# Patient Record
Sex: FEMALE | Employment: PART TIME | URBAN - METROPOLITAN AREA
[De-identification: names, ages, dates, MRNs, and addresses within clinical notes are randomized per-mention and may not be internally consistent; named-entity substitution may affect disease eponyms.]

---

## 2021-02-16 ENCOUNTER — HOSPITAL ENCOUNTER (INPATIENT)
Facility: HOSPITAL | Age: 51
LOS: 4 days | Discharge: HOME | DRG: 673 | End: 2021-02-20
Attending: EMERGENCY MEDICINE | Admitting: HOSPITALIST
Payer: COMMERCIAL

## 2021-02-16 ENCOUNTER — APPOINTMENT (OUTPATIENT)
Dept: CT IMAGING | Age: 51
DRG: 673 | End: 2021-02-16
Attending: EMERGENCY MEDICINE
Payer: COMMERCIAL

## 2021-02-16 DIAGNOSIS — N28.0 RENAL INFARCTION (HCC): Primary | ICD-10-CM

## 2021-02-16 DIAGNOSIS — I10 HYPERTENSION, UNSPECIFIED TYPE: ICD-10-CM

## 2021-02-16 DIAGNOSIS — I82.3 RENAL VEIN THROMBOSIS (HCC): ICD-10-CM

## 2021-02-16 LAB
ALBUMIN SERPL-MCNC: 4.1 G/DL (ref 3.4–5)
ALBUMIN/GLOB SERPL: 1.2 {RATIO} (ref 1–2)
ALP LIVER SERPL-CCNC: 46 U/L
ALT SERPL-CCNC: 73 U/L
ANION GAP SERPL CALC-SCNC: 6 MMOL/L (ref 0–18)
APTT PPP: 27.6 SECONDS (ref 25.4–36.1)
AST SERPL-CCNC: 70 U/L (ref 15–37)
BASOPHILS # BLD AUTO: 0.03 X10(3) UL (ref 0–0.2)
BASOPHILS NFR BLD AUTO: 0.2 %
BILIRUB SERPL-MCNC: 1.3 MG/DL (ref 0.1–2)
BILIRUB UR QL STRIP.AUTO: NEGATIVE
BUN BLD-MCNC: 11 MG/DL (ref 7–18)
BUN/CREAT SERPL: 10.6 (ref 10–20)
CALCIUM BLD-MCNC: 9.2 MG/DL (ref 8.5–10.1)
CHLORIDE SERPL-SCNC: 101 MMOL/L (ref 98–112)
CLARITY UR REFRACT.AUTO: CLEAR
CO2 SERPL-SCNC: 27 MMOL/L (ref 21–32)
COLOR UR AUTO: YELLOW
CREAT BLD-MCNC: 1.04 MG/DL
DEPRECATED RDW RBC AUTO: 43.3 FL (ref 35.1–46.3)
EOSINOPHIL # BLD AUTO: 0.06 X10(3) UL (ref 0–0.7)
EOSINOPHIL NFR BLD AUTO: 0.5 %
ERYTHROCYTE [DISTWIDTH] IN BLOOD BY AUTOMATED COUNT: 13.5 % (ref 11–15)
GLOBULIN PLAS-MCNC: 3.3 G/DL (ref 2.8–4.4)
GLUCOSE BLD-MCNC: 113 MG/DL (ref 70–99)
GLUCOSE UR STRIP.AUTO-MCNC: NEGATIVE MG/DL
HCT VFR BLD AUTO: 40.3 %
HGB BLD-MCNC: 13.6 G/DL
IMM GRANULOCYTES # BLD AUTO: 0.04 X10(3) UL (ref 0–1)
IMM GRANULOCYTES NFR BLD: 0.3 %
INR BLD: 0.95 (ref 0.88–1.11)
KETONES UR STRIP.AUTO-MCNC: 40 MG/DL
LEUKOCYTE ESTERASE UR QL STRIP.AUTO: NEGATIVE
LIPASE SERPL-CCNC: 109 U/L (ref 73–393)
LYMPHOCYTES # BLD AUTO: 1.29 X10(3) UL (ref 1–4)
LYMPHOCYTES NFR BLD AUTO: 9.7 %
M PROTEIN MFR SERPL ELPH: 7.4 G/DL (ref 6.4–8.2)
MCH RBC QN AUTO: 29.4 PG (ref 26–34)
MCHC RBC AUTO-ENTMCNC: 33.7 G/DL (ref 31–37)
MCV RBC AUTO: 87.2 FL
MONOCYTES # BLD AUTO: 0.36 X10(3) UL (ref 0.1–1)
MONOCYTES NFR BLD AUTO: 2.7 %
NEUTROPHILS # BLD AUTO: 11.5 X10 (3) UL (ref 1.5–7.7)
NEUTROPHILS # BLD AUTO: 11.5 X10(3) UL (ref 1.5–7.7)
NEUTROPHILS NFR BLD AUTO: 86.6 %
NITRITE UR QL STRIP.AUTO: NEGATIVE
OSMOLALITY SERPL CALC.SUM OF ELEC: 278 MOSM/KG (ref 275–295)
PH UR STRIP.AUTO: 6 [PH] (ref 4.5–8)
PLATELET # BLD AUTO: 249 10(3)UL (ref 150–450)
POCT LOT NUMBER: NORMAL
POCT URINE PREGNANCY: NEGATIVE
POTASSIUM SERPL-SCNC: 3.7 MMOL/L (ref 3.5–5.1)
PROT UR STRIP.AUTO-MCNC: NEGATIVE MG/DL
PSA SERPL DL<=0.01 NG/ML-MCNC: 12.6 SECONDS (ref 12–14.3)
RBC # BLD AUTO: 4.62 X10(6)UL
SARS-COV-2 RNA RESP QL NAA+PROBE: NOT DETECTED
SODIUM SERPL-SCNC: 134 MMOL/L (ref 136–145)
SP GR UR STRIP.AUTO: >=1.03 (ref 1–1.03)
UROBILINOGEN UR STRIP.AUTO-MCNC: 1 MG/DL
WBC # BLD AUTO: 13.3 X10(3) UL (ref 4–11)

## 2021-02-16 PROCEDURE — 74177 CT ABD & PELVIS W/CONTRAST: CPT | Performed by: EMERGENCY MEDICINE

## 2021-02-16 PROCEDURE — 99223 1ST HOSP IP/OBS HIGH 75: CPT | Performed by: HOSPITALIST

## 2021-02-16 RX ORDER — HEPARIN SODIUM AND DEXTROSE 10000; 5 [USP'U]/100ML; G/100ML
INJECTION INTRAVENOUS CONTINUOUS
Status: DISCONTINUED | OUTPATIENT
Start: 2021-02-17 | End: 2021-02-18

## 2021-02-16 RX ORDER — HEPARIN SODIUM 5000 [USP'U]/ML
60 INJECTION INTRAVENOUS; SUBCUTANEOUS ONCE
Status: COMPLETED | OUTPATIENT
Start: 2021-02-16 | End: 2021-02-16

## 2021-02-16 RX ORDER — HYDROMORPHONE HYDROCHLORIDE 1 MG/ML
0.5 INJECTION, SOLUTION INTRAMUSCULAR; INTRAVENOUS; SUBCUTANEOUS EVERY 30 MIN PRN
Status: DISCONTINUED | OUTPATIENT
Start: 2021-02-16 | End: 2021-02-19

## 2021-02-16 RX ORDER — LABETALOL HYDROCHLORIDE 5 MG/ML
20 INJECTION, SOLUTION INTRAVENOUS ONCE
Status: COMPLETED | OUTPATIENT
Start: 2021-02-16 | End: 2021-02-16

## 2021-02-16 RX ORDER — ACETAMINOPHEN, ASPIRIN AND CAFFEINE 250; 250; 65 MG/1; MG/1; MG/1
1 TABLET, FILM COATED ORAL EVERY 6 HOURS PRN
COMMUNITY

## 2021-02-16 RX ORDER — ONDANSETRON 2 MG/ML
4 INJECTION INTRAMUSCULAR; INTRAVENOUS ONCE
Status: COMPLETED | OUTPATIENT
Start: 2021-02-16 | End: 2021-02-16

## 2021-02-16 RX ORDER — HEPARIN SODIUM AND DEXTROSE 10000; 5 [USP'U]/100ML; G/100ML
12 INJECTION INTRAVENOUS ONCE
Status: COMPLETED | OUTPATIENT
Start: 2021-02-16 | End: 2021-02-17

## 2021-02-16 RX ORDER — KETOROLAC TROMETHAMINE 15 MG/ML
15 INJECTION, SOLUTION INTRAMUSCULAR; INTRAVENOUS ONCE
Status: COMPLETED | OUTPATIENT
Start: 2021-02-16 | End: 2021-02-16

## 2021-02-17 PROBLEM — I16.1 HYPERTENSIVE EMERGENCY: Status: ACTIVE | Noted: 2021-02-17

## 2021-02-17 PROBLEM — F41.9 ANXIETY: Status: ACTIVE | Noted: 2021-02-17

## 2021-02-17 LAB
ANION GAP SERPL CALC-SCNC: 7 MMOL/L (ref 0–18)
APTT PPP: 56.2 SECONDS (ref 25.4–36.1)
APTT PPP: 59.2 SECONDS (ref 25.4–36.1)
APTT PPP: 60.1 SECONDS (ref 25.4–36.1)
APTT PPP: 68.8 SECONDS (ref 25.4–36.1)
BASOPHILS # BLD AUTO: 0.02 X10(3) UL (ref 0–0.2)
BASOPHILS NFR BLD AUTO: 0.2 %
BUN BLD-MCNC: 11 MG/DL (ref 7–18)
BUN/CREAT SERPL: 9.7 (ref 10–20)
CALCIUM BLD-MCNC: 9 MG/DL (ref 8.5–10.1)
CHLORIDE SERPL-SCNC: 104 MMOL/L (ref 98–112)
CO2 SERPL-SCNC: 26 MMOL/L (ref 21–32)
CREAT BLD-MCNC: 1.13 MG/DL
DEPRECATED RDW RBC AUTO: 44.1 FL (ref 35.1–46.3)
EOSINOPHIL # BLD AUTO: 0.05 X10(3) UL (ref 0–0.7)
EOSINOPHIL NFR BLD AUTO: 0.5 %
ERYTHROCYTE [DISTWIDTH] IN BLOOD BY AUTOMATED COUNT: 13.4 % (ref 11–15)
GLUCOSE BLD-MCNC: 109 MG/DL (ref 70–99)
HCT VFR BLD AUTO: 35.7 %
HGB BLD-MCNC: 12 G/DL
IMM GRANULOCYTES # BLD AUTO: 0.02 X10(3) UL (ref 0–1)
IMM GRANULOCYTES NFR BLD: 0.2 %
LYMPHOCYTES # BLD AUTO: 1.38 X10(3) UL (ref 1–4)
LYMPHOCYTES NFR BLD AUTO: 13.8 %
MCH RBC QN AUTO: 30.1 PG (ref 26–34)
MCHC RBC AUTO-ENTMCNC: 33.6 G/DL (ref 31–37)
MCV RBC AUTO: 89.5 FL
MONOCYTES # BLD AUTO: 0.82 X10(3) UL (ref 0.1–1)
MONOCYTES NFR BLD AUTO: 8.2 %
NEUTROPHILS # BLD AUTO: 7.74 X10 (3) UL (ref 1.5–7.7)
NEUTROPHILS # BLD AUTO: 7.74 X10(3) UL (ref 1.5–7.7)
NEUTROPHILS NFR BLD AUTO: 77.1 %
OSMOLALITY SERPL CALC.SUM OF ELEC: 284 MOSM/KG (ref 275–295)
PLATELET # BLD AUTO: 187 10(3)UL (ref 150–450)
PLATELET # BLD AUTO: 187 10(3)UL (ref 150–450)
POTASSIUM SERPL-SCNC: 4.3 MMOL/L (ref 3.5–5.1)
RBC # BLD AUTO: 3.99 X10(6)UL
SODIUM SERPL-SCNC: 137 MMOL/L (ref 136–145)
WBC # BLD AUTO: 10 X10(3) UL (ref 4–11)

## 2021-02-17 RX ORDER — LISINOPRIL 10 MG/1
10 TABLET ORAL DAILY
Status: DISCONTINUED | OUTPATIENT
Start: 2021-02-17 | End: 2021-02-19

## 2021-02-17 RX ORDER — BISACODYL 10 MG
10 SUPPOSITORY, RECTAL RECTAL
Status: DISCONTINUED | OUTPATIENT
Start: 2021-02-17 | End: 2021-02-20

## 2021-02-17 RX ORDER — HYDRALAZINE HYDROCHLORIDE 20 MG/ML
10 INJECTION INTRAMUSCULAR; INTRAVENOUS EVERY 6 HOURS PRN
Status: DISCONTINUED | OUTPATIENT
Start: 2021-02-17 | End: 2021-02-20

## 2021-02-17 RX ORDER — HYDROMORPHONE HYDROCHLORIDE 1 MG/ML
0.8 INJECTION, SOLUTION INTRAMUSCULAR; INTRAVENOUS; SUBCUTANEOUS EVERY 2 HOUR PRN
Status: DISCONTINUED | OUTPATIENT
Start: 2021-02-17 | End: 2021-02-19

## 2021-02-17 RX ORDER — KETOROLAC TROMETHAMINE 15 MG/ML
15 INJECTION, SOLUTION INTRAMUSCULAR; INTRAVENOUS EVERY 6 HOURS PRN
Status: DISCONTINUED | OUTPATIENT
Start: 2021-02-17 | End: 2021-02-18

## 2021-02-17 RX ORDER — KETOROLAC TROMETHAMINE 30 MG/ML
30 INJECTION, SOLUTION INTRAMUSCULAR; INTRAVENOUS EVERY 6 HOURS PRN
Status: DISCONTINUED | OUTPATIENT
Start: 2021-02-17 | End: 2021-02-18

## 2021-02-17 RX ORDER — HYDROMORPHONE HYDROCHLORIDE 1 MG/ML
0.2 INJECTION, SOLUTION INTRAMUSCULAR; INTRAVENOUS; SUBCUTANEOUS EVERY 2 HOUR PRN
Status: DISCONTINUED | OUTPATIENT
Start: 2021-02-17 | End: 2021-02-19

## 2021-02-17 RX ORDER — METOCLOPRAMIDE HYDROCHLORIDE 5 MG/ML
10 INJECTION INTRAMUSCULAR; INTRAVENOUS EVERY 6 HOURS PRN
Status: DISCONTINUED | OUTPATIENT
Start: 2021-02-17 | End: 2021-02-20

## 2021-02-17 RX ORDER — ACETAMINOPHEN 325 MG/1
650 TABLET ORAL EVERY 6 HOURS PRN
Status: DISCONTINUED | OUTPATIENT
Start: 2021-02-17 | End: 2021-02-20

## 2021-02-17 RX ORDER — DOCUSATE SODIUM 100 MG/1
100 CAPSULE, LIQUID FILLED ORAL 2 TIMES DAILY
Status: DISCONTINUED | OUTPATIENT
Start: 2021-02-17 | End: 2021-02-20

## 2021-02-17 RX ORDER — SODIUM PHOSPHATE, DIBASIC AND SODIUM PHOSPHATE, MONOBASIC 7; 19 G/133ML; G/133ML
1 ENEMA RECTAL ONCE AS NEEDED
Status: DISCONTINUED | OUTPATIENT
Start: 2021-02-17 | End: 2021-02-20

## 2021-02-17 RX ORDER — ASPIRIN 325 MG
325 TABLET ORAL DAILY
Status: DISCONTINUED | OUTPATIENT
Start: 2021-02-17 | End: 2021-02-20

## 2021-02-17 RX ORDER — POLYETHYLENE GLYCOL 3350 17 G/17G
17 POWDER, FOR SOLUTION ORAL DAILY PRN
Status: DISCONTINUED | OUTPATIENT
Start: 2021-02-17 | End: 2021-02-20

## 2021-02-17 RX ORDER — ONDANSETRON 2 MG/ML
4 INJECTION INTRAMUSCULAR; INTRAVENOUS EVERY 6 HOURS PRN
Status: DISCONTINUED | OUTPATIENT
Start: 2021-02-17 | End: 2021-02-20

## 2021-02-17 RX ORDER — HYDROMORPHONE HYDROCHLORIDE 1 MG/ML
0.4 INJECTION, SOLUTION INTRAMUSCULAR; INTRAVENOUS; SUBCUTANEOUS EVERY 2 HOUR PRN
Status: DISCONTINUED | OUTPATIENT
Start: 2021-02-17 | End: 2021-02-19

## 2021-02-17 RX ORDER — LORAZEPAM 0.5 MG/1
0.5 TABLET ORAL EVERY 4 HOURS PRN
Status: DISCONTINUED | OUTPATIENT
Start: 2021-02-17 | End: 2021-02-18

## 2021-02-17 NOTE — PLAN OF CARE
Pt is A&O x4. Pt reports she is \"very anxious, every day\". BP elevated despite receiving labetalol in [place] ED. MD aware- initiated PO lisinopril daily and PRN hydralazine for SBP >160. NSR on tele. SpO2 stable on RA. Lung sounds clear.  Pt c/o RLQ a cognitive and physical deficits and behaviors that affect risk of falls.   - [place] fall precautions as indicated by assessment.  - Educate pt/family on patient safety including physical limitations  - Instruct pt to call for assistance with activity bas and forceful nose blowing  Outcome: Progressing

## 2021-02-17 NOTE — PROGRESS NOTES
[facility]  Progress Note     Malik Herrera Patient Status:  Inpatient    10/21/1970 MRN [id number]   [facility] 3NE-A Attending Judy Oakes MD   Hosp Day # 1 PCP None Pcp     Chief Complaint:  abd pain   S:   Pain is  Less now hydralazine prn and start lisinopril. Goal 100-140 per PVS recs   MARCELO P   3. 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 Vascular surgery on consult. 4. Anxiety-  on prn ativan.     Quality:  · DVT Prophylaxis: Heparin drip  · CODE status:  Full

## 2021-02-17 NOTE — PLAN OF CARE
Assumed care at 0730. A&ox4, RA, VSS, NSR on tele. Regular diet. Safety precautions in place. Dilaudid for pain. PRN hydralazine given for high BP. Heparin infusing at 9mL/hr to left AC PIV. Pt up ad kurtis and tolerating activity well. CT and CTA tomorrow.  Jordana Gomes with activity based on assessment  - Modify environment to reduce risk of injury  - Provide assistive devices as appropriate  - Consider OT/PT consult to assist with strengthening/mobility  - Encourage toileting schedule  Outcome: Progressing     Problem:

## 2021-02-17 NOTE — PROGRESS NOTES
NURSING ADMISSION NOTE      Patient admitted via Ambulance  Oriented to room. Safety precautions initiated. Bed in low position. Call light in reach. Pt admitted from [place] ER on a heparin gtt. Heparin gtt infusing as ordered.  Admission va

## 2021-02-17 NOTE — ED INITIAL ASSESSMENT (HPI)
Pt c/o r sided abd pain onset fews hours ago. C/o nausea. Denies fevers or diarrhea. Pain radiating to back. Sudden onset.

## 2021-02-17 NOTE — CM/SW NOTE
Patient discussed during rounds. Admitted for renal infarction. Currently on heparin drip. No identified or anticipated dc planning needs.     Negra Ambrocio, LCSW

## 2021-02-17 NOTE — CONSULTS
Vascular Surgery  New Patient Consultation    Name: Kevin Tony  MRN: [id number]  : 10/21/1970    Referring Provider: No ref.  provider found    CC: Right renal artery dissection    HPI: 53-year-old female with hx of hypertension and depression who pr Number of children: Not on file      Years of education: Not on file      Highest education level: Not on file    Tobacco Use      Smoking status: Former Smoker        Packs/day: 0.00        Quit date: 2020        Years since quittin.6      Smok Skin no rashes or skin lesions identified  Neck supple; no LAD; trachea midline  RRR  CTAB; breathing comfortably  Abd soft, NT, ND;  no palpable abdominal masses.   RUE: Palpable radial and ulnar pulse  LUE: Palpable radial and ulnar pulse  RLE: palp Fem,

## 2021-02-17 NOTE — ED NOTES
Pt states pain improved after dilaudid. Report given to medics rn. Pt transported to [facility] in fair condition.

## 2021-02-17 NOTE — H&P
[facility]  History and Physical     Penelope Wei Patient Status:  Inpatient    10/21/1970 MRN [id number]   [facility] 3NE-A Attending Diego Chacon MD   Hosp Day # 1 PCP None Pcp     Chief Complaint: Abdominal pain    History Associated Paternal Grandfather    • Obesity Sister    • Schizophrenia Maternal Uncle         Allergies:   Shellfish-Derived P*    TONGUE SWELLING  Milk Protein-Mometa*        Comment:Milk Fat  Pistachios                Sulfa Antibiotics         Iodine (To *   BUN 11   CREATSERUM 1.04*   GFRAA 73   GFRNAA 64   CA 9.2   ALB 4.1   *   K 3.7      CO2 27.0   ALKPHO 46   AST 70*   ALT 73*   BILT 1.3   TP 7.4       Estimated Creatinine Clearance: 61.9 mL/min (A) (based on SCr of 1.04 mg/dL (H)

## 2021-02-17 NOTE — ED PROVIDER NOTES
Patient Seen in: [facility] Emergency Department In [place]      History   Patient presents with:  Abdomen/Flank Pain    Stated Complaint: abdom pain    HPI/Subjective:   HPI    45-year-old female comes to the hospital complaint of having difficulty with LAD  Heart: S1S2 normal. No murmurs, regular rate and rhythm  Lungs: Clear to auscultation bilaterally  Abdomen: Soft right lower quadrant region is tender nondistended normal active bowel sounds without rebound, guarding or masses noted  Back nontender wi TECHNIQUE:  CT scanning was performed from the dome of the diaphragm to the pubic symphysis with non-ionic intravenous contrast material. Post contrast coronal MPR imaging was performed. Dose reduction techniques were used.  Dose information is transmitted PM     Finalized by (CST): Mayra Peña MD on 2/16/2021 at 7:50 PM       Medications   Heparin Sodium (Porcine) 5000 UNIT/ML injection 3,900 Units (has no administration in time range)   heparin (PORCINE) 28976ectsr/250mL infusion ED INITIAL DOSE (has no ICD-10-CM Noted POA    Renal infarction ([facility] [address] N28.0 2/16/2021 Unknown

## 2021-02-17 NOTE — BH PROGRESS NOTE
Went to see the pt to follow up with her for the phq 4. She said, she does not have a history of anxiety or depression. She said she was just anxious when she came into the hospital.  The pt didn't want the assessment questions done.   She said, \"I am fi

## 2021-02-17 NOTE — PAYOR COMM NOTE
Appropriate for inpatient status per guidelines for abd pain due to renal artery dissection and infarct with hypertensive emergency.      --------------  ADMISSION REVIEW     Payor: [address] [place] [address]  Subscriber #:  [id number]  Authorization [place] Physical Exam    HEENT : NCAT, EOMI, PEERL,  neck supple, no JVD, trachea midline, No LAD  Heart: S1S2 normal. No murmurs, regular rate and rhythm  Lungs: Clear to auscultation bilaterally  Abdomen: Soft right lower quadrant region is tender nondis Patient complain of pain right lower quadrant for few hours since shoveling snow today. CONTRAST USED:  77cc of Omnipaque 350  FINDINGS:  LUNG BASE:  Atelectasis. LIVER:  Homogeneous enhancement. BILIARY:  No biliary ductal dilatation.  PANCREAS:  Homogen Care Note:  The patient arrived with a condition with significant morbidity and mortality associated.  The services I provided  were to promote improvement and reduce mortality specifically involving complex record review, complex medical decisions and inte carotid bruits. Respiratory: Clear to auscultation bilaterally. No wheezes. No rhonchi. Cardiovascular: S1, S2. Regular rate and rhythm. No murmurs, rubs or gallops. Equal pulses. Chest and Back: No tenderness or deformity.   Abdomen: Soft, tenderness i family history of any fibromuscular dysplasia. This is likely spontaneous due to severe hypertension. She will need this to be controlled in the 100-1 40 range.   On review of the imaging I would recommend refraining from surgical intervention at this [place]

## 2021-02-18 ENCOUNTER — APPOINTMENT (OUTPATIENT)
Dept: CT IMAGING | Facility: HOSPITAL | Age: 51
DRG: 673 | End: 2021-02-18
Attending: SURGERY
Payer: COMMERCIAL

## 2021-02-18 ENCOUNTER — APPOINTMENT (OUTPATIENT)
Dept: INTERVENTIONAL RADIOLOGY/VASCULAR | Facility: HOSPITAL | Age: 51
DRG: 673 | End: 2021-02-18
Attending: SURGERY
Payer: COMMERCIAL

## 2021-02-18 LAB
APTT PPP: 64.1 SECONDS (ref 25.4–36.1)
CREAT BLD-MCNC: 1.39 MG/DL
GLUCOSE BLD-MCNC: 104 MG/DL (ref 70–99)
PLATELET # BLD AUTO: 178 10(3)UL (ref 150–450)

## 2021-02-18 PROCEDURE — 74174 CTA ABD&PLVS W/CONTRAST: CPT | Performed by: SURGERY

## 2021-02-18 PROCEDURE — 99233 SBSQ HOSP IP/OBS HIGH 50: CPT | Performed by: STUDENT IN AN ORGANIZED HEALTH CARE EDUCATION/TRAINING PROGRAM

## 2021-02-18 PROCEDURE — B416YZZ FLUOROSCOPY OF RIGHT RENAL ARTERY USING OTHER CONTRAST: ICD-10-PCS | Performed by: SURGERY

## 2021-02-18 PROCEDURE — 04L93DZ OCCLUSION OF RIGHT RENAL ARTERY WITH INTRALUMINAL DEVICE, PERCUTANEOUS APPROACH: ICD-10-PCS | Performed by: SURGERY

## 2021-02-18 RX ORDER — PROTAMINE SULFATE 10 MG/ML
INJECTION, SOLUTION INTRAVENOUS
Status: COMPLETED
Start: 2021-02-18 | End: 2021-02-18

## 2021-02-18 RX ORDER — MIDAZOLAM HYDROCHLORIDE 1 MG/ML
INJECTION INTRAMUSCULAR; INTRAVENOUS
Status: COMPLETED
Start: 2021-02-18 | End: 2021-02-18

## 2021-02-18 RX ORDER — LIDOCAINE HYDROCHLORIDE 10 MG/ML
INJECTION, SOLUTION EPIDURAL; INFILTRATION; INTRACAUDAL; PERINEURAL
Status: COMPLETED
Start: 2021-02-18 | End: 2021-02-18

## 2021-02-18 RX ORDER — HEPARIN SODIUM 5000 [USP'U]/ML
INJECTION, SOLUTION INTRAVENOUS; SUBCUTANEOUS
Status: COMPLETED
Start: 2021-02-18 | End: 2021-02-18

## 2021-02-18 RX ORDER — DIPHENHYDRAMINE HYDROCHLORIDE 50 MG/ML
INJECTION INTRAMUSCULAR; INTRAVENOUS
Status: COMPLETED
Start: 2021-02-18 | End: 2021-02-18

## 2021-02-18 RX ORDER — LORAZEPAM 2 MG/ML
0.5 INJECTION INTRAMUSCULAR EVERY 8 HOURS PRN
Status: DISCONTINUED | OUTPATIENT
Start: 2021-02-18 | End: 2021-02-20

## 2021-02-18 RX ORDER — LABETALOL HYDROCHLORIDE 5 MG/ML
INJECTION, SOLUTION INTRAVENOUS
Status: COMPLETED
Start: 2021-02-18 | End: 2021-02-18

## 2021-02-18 NOTE — PLAN OF CARE
Pt. A&O x4, on RA, tele shows NSR. VSS. Up with standby. Pain located in right kidney, back, and stomach. Pain medication and antiemetics given per MAR PRN. Heparin drip running at 10/hr, increased to 11/hr per PTT result, MD notified.  AM PTT also resulted physical limitations  - Instruct pt to call for assistance with activity based on assessment  - Modify environment to reduce risk of injury  - Provide assistive devices as appropriate  - Consider OT/PT consult to assist with strengthening/mobility  - Encou

## 2021-02-18 NOTE — PROGRESS NOTES
[facility]  Progress Note     Jenifer Hollins Patient Status:  Inpatient    10/21/1970 MRN [id number]   [facility] 3NE-A Attending Nicol Espino MD   Hosp Day # 2 PCP None Pcp     Chief Complaint: Flank, lower abdominal pain     S: P No results for input(s): TROP, CK in the last 168 hours. Imaging: Imaging data reviewed in Epic.     Medications:   • docusate sodium  100 mg Oral BID   • lisinopril  10 mg Oral Daily   • aspirin  325 mg Oral Daily       ASSESSMENT / PLAN:

## 2021-02-18 NOTE — PLAN OF CARE
Assumed pt care @  [id number]. Received pt from cath lab s/p renal aneurysm coiling. R groin w/ femstop, no complication, VSS, site assessments performed per protocol. Per order, femstop for 2hr, supine for 4 hr. Pt updated on plan of care.      1500: attempted to

## 2021-02-18 NOTE — PROGRESS NOTES
Vascular Surgery Progress Note    Patient seen and examined. I informed her of the results of the CT scan. 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   Her kidney has continued to worsen despite conservative management with antiplatelet

## 2021-02-18 NOTE — OPERATIVE REPORT
Vascular Surgery Op Note  Patients Name:    Silviojordon Clarke    Operating Physician: Simón Tilley MD  CSN:    [id number]                                                           Location:  OR  MRN:     [id number] was not previously present on the CAT scan 2 days prior. As such I recommended the patient for a right renal angiogram with the possibility for intervention. I discussed the risks and benefits of the procedure.   I informed her that if there was a suitabl were put in place without any issues. Renal angiogram at completion revealed no further flow within the right renal artery with no opacification of the aneurysm and no further extravasation.   At this point we are satisfied and opted to terminate the proce

## 2021-02-18 NOTE — PLAN OF CARE
Patient A&O x 4. Room air. Resting in bed this AM. Returned from CT scan. Notified of critical results. Vascular and Hospitalist both notified about CT results. NPO, scheduled for Cath. Up ad kurtis to bathroom. Complaints of nausea, zofran given.  Heparin gtt

## 2021-02-18 NOTE — CONSULTS
Hem/Onc Report of Consultation    Patient Name: Sean Morales   YOB: 1970   Medical Record Number: [id number]   CSN: [id number]   Consulting oncology Physician: Dr Rakesh Segovia  Referring Provider(s): Dr Tashia Adams  Date of Consultation: 2/18/2021 and Other Disorders Associated Maternal Grandfather    • Hypertension Paternal Grandfather    • Alcohol and Other Disorders Associated Paternal Grandfather    • Obesity Sister    • Schizophrenia Maternal Uncle      Psychosocial History:  Social History (Topical)        RASH, ITCHING  Red Wine Complex        RASH    Current Medications:    •  [COMPLETED] iohexol (OMNIPAQUE) 350 MG/ML injection 100 mL, 100 mL, Intravenous, ONCE PRN    •  LORazepam (ATIVAN) injection 0.5 mg, 0.5 mg, Intravenous, Q8H PRN 10 mg, Intravenous, Q6H PRN    •  lisinopril tab 10 mg, 10 mg, Oral, Daily    •  Metoclopramide HCl (REGLAN) injection 10 mg, 10 mg, Intravenous, Q6H PRN    •  aspirin tab 325 mg, 325 mg, Oral, Daily    •  [COMPLETED] Ketorolac Tromethamine (TORADOL) injec lb 14.4 oz)   LMP 11/16/2020   SpO2 97%   BMI 25.49 kg/m²     Physical Examination:  General: Patient is alert and oriented x 3, not in acute distress. HEENT: EOMs intact. PERRL. Oropharynx is clear. Chest: Clear to auscultation.   Heart: Regular rate an pseudoaneurysm. There is arterial flow within a small segmental renal artery supplying   the anterior mid right kidney. There are multiple large wedge-shaped areas of infarction and decreased perfusion involving the right kidney.   There is flow within th

## 2021-02-19 LAB
ANA SCREEN: NEGATIVE
APTT PPP: 36.8 SECONDS (ref 25.4–36.1)
BETA 2 GLYCOPROTEIN 1 AB, IGG: 0.8 U/ML (ref ?–7)
BETA 2 GLYCOPROTEIN 1 AB, IGM: <2.9 U/ML (ref ?–7)
CARDIOLIPIN IGG SERPL-ACNC: 0.5 GPL (ref ?–10)
CARDIOLIPIN IGM SERPL-ACNC: 8 MPL (ref ?–10)
DRVVT LUPUS ANTICOAGULANT: NEGATIVE
DRVVT SCREEN RATIO: 0.94 (ref 0–1.29)
F2 C.20210G>A GENO BLD/T: NORMAL
F5 P.R506Q BLD/T QL: NORMAL
PLATELET # BLD AUTO: 170 10(3)UL (ref 150–450)
PT(LUPUS): 13.9 SECONDS (ref 12.1–14.7)
STACLOT LA DELTA: 11.8 SECONDS (ref ?–8)
STACLOT LA: POSITIVE

## 2021-02-19 PROCEDURE — 99233 SBSQ HOSP IP/OBS HIGH 50: CPT | Performed by: STUDENT IN AN ORGANIZED HEALTH CARE EDUCATION/TRAINING PROGRAM

## 2021-02-19 PROCEDURE — 99223 1ST HOSP IP/OBS HIGH 75: CPT | Performed by: SPECIALIST

## 2021-02-19 RX ORDER — HYDROCODONE BITARTRATE AND ACETAMINOPHEN 5; 325 MG/1; MG/1
1 TABLET ORAL ONCE
Status: COMPLETED | OUTPATIENT
Start: 2021-02-19 | End: 2021-02-19

## 2021-02-19 RX ORDER — SODIUM CHLORIDE 9 MG/ML
INJECTION, SOLUTION INTRAVENOUS CONTINUOUS
Status: DISCONTINUED | OUTPATIENT
Start: 2021-02-19 | End: 2021-02-19

## 2021-02-19 RX ORDER — ALPRAZOLAM 0.25 MG/1
0.25 TABLET ORAL ONCE
Status: COMPLETED | OUTPATIENT
Start: 2021-02-19 | End: 2021-02-19

## 2021-02-19 RX ORDER — AMLODIPINE BESYLATE 2.5 MG/1
2.5 TABLET ORAL ONCE
Status: COMPLETED | OUTPATIENT
Start: 2021-02-19 | End: 2021-02-19

## 2021-02-19 RX ORDER — AMLODIPINE BESYLATE 2.5 MG/1
2.5 TABLET ORAL DAILY
Status: DISCONTINUED | OUTPATIENT
Start: 2021-02-19 | End: 2021-02-19

## 2021-02-19 RX ORDER — LISINOPRIL 10 MG/1
10 TABLET ORAL DAILY
Qty: 30 TABLET | Refills: 0 | Status: SHIPPED | OUTPATIENT
Start: 2021-02-20 | End: 2021-02-20

## 2021-02-19 RX ORDER — AMLODIPINE BESYLATE 2.5 MG/1
2.5 TABLET ORAL DAILY
Qty: 30 TABLET | Refills: 0 | Status: SHIPPED | OUTPATIENT
Start: 2021-02-20 | End: 2021-02-20

## 2021-02-19 RX ORDER — BUTALBITAL, ACETAMINOPHEN AND CAFFEINE 50; 325; 40 MG/1; MG/1; MG/1
1-2 TABLET ORAL EVERY 4 HOURS PRN
Status: DISCONTINUED | OUTPATIENT
Start: 2021-02-19 | End: 2021-02-20

## 2021-02-19 RX ORDER — SENNOSIDES 8.6 MG
8.6 TABLET ORAL 2 TIMES DAILY
Status: DISCONTINUED | OUTPATIENT
Start: 2021-02-19 | End: 2021-02-20

## 2021-02-19 RX ORDER — AMLODIPINE BESYLATE 5 MG/1
5 TABLET ORAL DAILY
Status: DISCONTINUED | OUTPATIENT
Start: 2021-02-20 | End: 2021-02-20

## 2021-02-19 NOTE — CONSULTS
Hem/Onc Report of Consultation    Patient Name: Yogesh Suarez   YOB: 1970   Medical Record Number: [id number]   Consulting oncology Physician: Dr Grimes Favorite  Referring Provider(s): Dr Debbie Dumont    Date of Consultation: 2/19/2021     Reason for Con Grandfather    • Diabetes Maternal Grandfather    • Hypertension Maternal Grandfather    • Alcohol and Other Disorders Associated Maternal Grandfather    • Hypertension Paternal Grandfather    • Alcohol and Other Disorders Associated Paternal Grandfather Protein-Mometa*        Comment:Milk Fat  Pistachios                Sulfa Antibiotics         Iodine (Topical)        RASH, ITCHING  Red Wine Complex        RASH    Current Medications:    •  HYDROcodone-acetaminophen (NORCO) 5-325 MG per tab 1 tablet, 1 ta mg, 10 mg, Intravenous, Q6H PRN    •  lisinopril tab 10 mg, 10 mg, Oral, Daily    •  Metoclopramide HCl (REGLAN) injection 10 mg, 10 mg, Intravenous, Q6H PRN    •  aspirin tab 325 mg, 325 mg, Oral, Daily    •  [COMPLETED] Ketorolac Tromethamine (TORADOL) i pain, palpitations, edema, orthopnea. Gastrointestinal   No nausea, vomiting, diarrhea, constipation, melena, hematochezia, heartburn, early satiety, change in stool caliber.   Genitourinary      No hematuria, dysuria, increased frequency, urgency, hesitan WITH CULTURE REFLEX    Collection Time: 02/16/21  6:12 PM    Specimen: Urine   Result Value Ref Range    Urine Color Yellow Yellow    Clarity Urine Clear Clear    Spec Gravity >=1.030 1.001 - 1.030    Glucose Urine Negative Negative mg/dl    Bilirubin Urin Absolute 11.50 (H) 1.50 - 7.70 x10(3) uL    Lymphocyte Absolute 1.29 1.00 - 4.00 x10(3) uL    Monocyte Absolute 0.36 0.10 - 1.00 x10(3) uL    Eosinophil Absolute 0.06 0.00 - 0.70 x10(3) uL    Basophil Absolute 0.03 0.00 - 0.20 x10(3) uL    Immature Granulo >=60   CBC W/ DIFFERENTIAL    Collection Time: 02/17/21  7:32 AM   Result Value Ref Range    WBC 10.0 4.0 - 11.0 x10(3) uL    RBC 3.99 3.80 - 5.30 x10(6)uL    HGB 12.0 12.0 - 16.0 g/dL    HCT 35.7 35.0 - 48.0 %    .0 150.0 - 450.0 10(3)uL    MCV 89. was performed from the dome of the diaphragm to the pubic symphysis with non-ionic intravenous contrast material. Post contrast coronal MPR imaging was performed. Dose reduction techniques were used.  Dose information is   transmitted to the ACR (American Suresh Golden MD on 2/16/2021 at 7:40 PM       Finalized by (CST): Suresh Golden MD on 2/16/2021 at 7:50 PM       PROCEDURE:  CTA ABD/PEL (ETW=05855)  COMPARISON:  [place], CT, CT ABDOMEN+PELVIS(CONTRAST ONLY)(CPT=74177), 2/16/2021, 7:19 PM.     Mirella Huerta visible mass, obstruction, or bowel wall thickening. ABDOMINAL WALL:  No mass or hernia. URINARY BLADDER:  No visible focal wall thickening, lesion, or calculus. PELVIC NODES:  No adenopathy.     PELVIC ORGANS:  There is slight increase in the free miscarriages. As a result a partial thrombophilia workup was sent including Factor V leiden and factor II genotypes and lupus anticoagulant panel.  Protein C and S and antithrombin III were not sent as patient was on heparin and acutely the results may not

## 2021-02-19 NOTE — PLAN OF CARE
Assumed care. Patient with pain to right side of abdomen overnight requiring IV dilaudid for relief. Tylenol given in conjunction. Relief of pain with IV dilaudid but pain returns. Ativan given for some anxiety.  Patient has been calm overnight and has been

## 2021-02-19 NOTE — PROGRESS NOTES
[facility]  Progress Note     Christine Sawant Patient Status:  Inpatient    10/21/1970 MRN [id number]   [facility] 3NE-A Attending Bertha Watts MD   Hosp Day # 3 PCP None Pcp     Chief Complaint: Flank, lower abdominal pain     S: P (based on SCr of 1.39 mg/dL (H)). Recent Labs   Lab 02/16/21  1812 02/18/21  1327   PTP 12.6 13.9   INR 0.95  --        No results for input(s): TROP, CK in the last 168 hours. Imaging: Imaging data reviewed in Epic.     Medications:   • amLODIPi

## 2021-02-20 VITALS
HEIGHT: 66 IN | TEMPERATURE: 98 F | WEIGHT: 157.88 LBS | OXYGEN SATURATION: 99 % | SYSTOLIC BLOOD PRESSURE: 142 MMHG | DIASTOLIC BLOOD PRESSURE: 75 MMHG | BODY MASS INDEX: 25.37 KG/M2 | RESPIRATION RATE: 22 BRPM | HEART RATE: 82 BPM

## 2021-02-20 LAB
ANION GAP SERPL CALC-SCNC: 7 MMOL/L (ref 0–18)
BUN BLD-MCNC: 12 MG/DL (ref 7–18)
BUN/CREAT SERPL: 9.4 (ref 10–20)
CALCIUM BLD-MCNC: 9.1 MG/DL (ref 8.5–10.1)
CHLORIDE SERPL-SCNC: 103 MMOL/L (ref 98–112)
CO2 SERPL-SCNC: 27 MMOL/L (ref 21–32)
CREAT BLD-MCNC: 1.27 MG/DL
GLUCOSE BLD-MCNC: 94 MG/DL (ref 70–99)
OSMOLALITY SERPL CALC.SUM OF ELEC: 284 MOSM/KG (ref 275–295)
POTASSIUM SERPL-SCNC: 3.9 MMOL/L (ref 3.5–5.1)
SODIUM SERPL-SCNC: 137 MMOL/L (ref 136–145)

## 2021-02-20 PROCEDURE — 99239 HOSP IP/OBS DSCHRG MGMT >30: CPT | Performed by: STUDENT IN AN ORGANIZED HEALTH CARE EDUCATION/TRAINING PROGRAM

## 2021-02-20 RX ORDER — AMLODIPINE BESYLATE 5 MG/1
5 TABLET ORAL DAILY
Qty: 30 TABLET | Refills: 0 | Status: SHIPPED | OUTPATIENT
Start: 2021-02-20

## 2021-02-20 RX ORDER — AMLODIPINE BESYLATE 10 MG/1
10 TABLET ORAL DAILY
Status: DISCONTINUED | OUTPATIENT
Start: 2021-02-20 | End: 2021-02-20

## 2021-02-20 RX ORDER — AMLODIPINE BESYLATE 5 MG/1
5 TABLET ORAL DAILY
Status: DISCONTINUED | OUTPATIENT
Start: 2021-02-20 | End: 2021-02-20

## 2021-02-20 RX ORDER — ASPIRIN 81 MG/1
81 TABLET ORAL DAILY
Qty: 30 TABLET | Refills: 0 | Status: SHIPPED | OUTPATIENT
Start: 2021-02-20

## 2021-02-20 NOTE — CONSULTS
[facility]  Report of Consultation    Julia Bay Patient Status:  Inpatient    10/21/1970 MRN [id number]   [facility] 7NE-A Attending Gabriele Patel MD   Hosp Day # 3 PCP None Pcp     Impression and Plan:  Renal artery dissection [START ON 2/20/2021] amLODIPine Besylate (NORVASC) tab 5 mg, 5 mg, Oral, Daily    •  [START ON 2/20/2021] lisinopril tab 15 mg, 15 mg, Oral, Daily    •  LORazepam (ATIVAN) injection 0.5 mg, 0.5 mg, Intravenous, Q8H PRN    •  acetaminophen (TYLENOL) tab 650 2020        Years since quittin.6      Smokeless tobacco: Never Used    Substance and Sexual Activity      Alcohol use: No        Alcohol/week: 0.0 standard drinks      Drug use: No    Review of Systems:  No SOB, angina, fevers, focal weakness, and

## 2021-02-20 NOTE — DISCHARGE SUMMARY
[facility] [place] HOSPITAL[facility]  DISCHARGE SUMMARY     Padmini Magdaleno Patient Status:  Inpatient    10/21/1970 MRN [id number]   [facility] 7NE-A Attending Moshe Lindsey MD   [facility] Day # 4 PCP None Pcp     Date of Admission: 2021  Date of Discharge: for result interpretation and further testing as OP. Lace+ Score: 35  59-90 High Risk  29-58 Medium Risk  0-28   Low Risk  Patient was referred to the [facility]. TCM Follow-Up Recommendation:  LACE 29-58:  Moderate Risk of re Besylate 5 MG Tabs         [place]: [place]    Follow-up appointment:   Melinda William MD  [address] [place] [address] Rd  [phone]    Schedule an appointment as soon as possible for a visit in [facility]

## 2021-02-20 NOTE — PLAN OF CARE
Assumed care @2838. Pt AOx4. VSS on RA. Pt non-compliant with fall risk precautions/bed alarm. Tried to reinforce fall risk teaching but unsuccessful. Pt verbalized fall last week in the snow. Video camera still in place.    Pain treated successfully with

## 2021-02-20 NOTE — PLAN OF CARE
Received patient A/Ox4, RA, NSR on tele 0700  Reporting pain to head and right MD tonya notified, Almeida Grams given, patient reported relief   Elevated blood pressure, MD aware, See orders  Ambulated to bathroom  Miralax given for constipation  Zofran give for assistance with activity based on assessment  - Modify environment to reduce risk of injury  - Provide assistive devices as appropriate  - Consider OT/PT consult to assist with strengthening/mobility  - Encourage toileting schedule  Outcome: Progressin

## 2021-02-20 NOTE — PLAN OF CARE
Assumed patient care at 1930  Anxious, agitated, and tearful at times; PRN ativan given  C/o back pain and lower abdominal pain/cramping; relief with PRN fioricet and warm packs  Some complaints of nausea; Zofran given x1  R groin dressing C/D/I; soft, no

## 2021-02-21 NOTE — PAYOR COMM NOTE
--------------  DISCHARGE REVIEW    Payor: [employer]  Subscriber #:  [id number]  Authorization Number: [id number]    Admit date: 2/16/21  Admit time:  2237  Discharge Date: 2/20/2021 12:51 PM     Admitting Physician: Hayde Brunson

## 2021-02-22 ENCOUNTER — PATIENT OUTREACH (OUTPATIENT)
Dept: CASE MANAGEMENT | Age: 51
End: 2021-02-22

## 2021-02-22 DIAGNOSIS — Z02.9 ENCOUNTERS FOR UNSPECIFIED ADMINISTRATIVE PURPOSE: ICD-10-CM

## 2021-02-22 NOTE — PROGRESS NOTES
Attempted to reach the patient to complete [facility] FU call. Left a message for the pt to call NCM back at, [phone]. The number for the TCC was also given to the patient to schedule at, [phone].

## 2021-02-23 NOTE — PROGRESS NOTES
Initial Post Discharge Follow Up   Discharge Date: 2/20/21  Contact Date: 2/23/2021    Consent Verification:  Assessment Completed With: Patient  HIPAA Verified? No; The patient refused.      Discharge Dx:     Abdominal pain, flank pain due to infarcted

## (undated) NOTE — LETTER
[facility] [address], [address]  Consent for Procedure/Sedation    Date: 02/18/21    Time: 10:53    1.  I authorize the performance upon Jeinfer Brilliant the following: Peripheral angiography, atherectomy, percutaneous transluminal Signature of person authorized                                           Relationship to  to consent for patient: ___________________________   patient: ___________________    Witness: ______________________________________  Date: _____________________